# Patient Record
Sex: MALE | Race: OTHER
[De-identification: names, ages, dates, MRNs, and addresses within clinical notes are randomized per-mention and may not be internally consistent; named-entity substitution may affect disease eponyms.]

---

## 2020-07-16 ENCOUNTER — HOSPITAL ENCOUNTER (EMERGENCY)
Dept: HOSPITAL 74 - JER | Age: 60
Discharge: HOME | End: 2020-07-16
Payer: COMMERCIAL

## 2020-07-16 VITALS — DIASTOLIC BLOOD PRESSURE: 82 MMHG | SYSTOLIC BLOOD PRESSURE: 137 MMHG | HEART RATE: 73 BPM

## 2020-07-16 VITALS — TEMPERATURE: 99.2 F

## 2020-07-16 VITALS — BODY MASS INDEX: 36.6 KG/M2

## 2020-07-16 DIAGNOSIS — M54.41: Primary | ICD-10-CM

## 2020-07-16 LAB
ALBUMIN SERPL-MCNC: 4.1 G/DL (ref 3.4–5)
ALP SERPL-CCNC: 76 U/L (ref 45–117)
ALT SERPL-CCNC: 37 U/L (ref 13–61)
ANION GAP SERPL CALC-SCNC: 8 MMOL/L (ref 8–16)
APPEARANCE UR: CLEAR
AST SERPL-CCNC: 22 U/L (ref 15–37)
BASOPHILS # BLD: 0.3 % (ref 0–2)
BILIRUB CONJ SERPL-MCNC: 0.2 MG/DL (ref 0–0.2)
BILIRUB SERPL-MCNC: 0.7 MG/DL (ref 0.2–1)
BILIRUB UR STRIP.AUTO-MCNC: NEGATIVE MG/DL
BUN SERPL-MCNC: 12.9 MG/DL (ref 7–18)
CALCIUM SERPL-MCNC: 9.8 MG/DL (ref 8.5–10.1)
CHLORIDE SERPL-SCNC: 104 MMOL/L (ref 98–107)
CO2 SERPL-SCNC: 26 MMOL/L (ref 21–32)
COLOR UR: YELLOW
CREAT SERPL-MCNC: 1.1 MG/DL (ref 0.55–1.3)
DEPRECATED RDW RBC AUTO: 13.3 % (ref 11.9–15.9)
EOSINOPHIL # BLD: 0.2 % (ref 0–4.5)
GLUCOSE SERPL-MCNC: 111 MG/DL (ref 74–106)
HCT VFR BLD CALC: 45.5 % (ref 35.4–49)
HGB BLD-MCNC: 15.4 GM/DL (ref 11.7–16.9)
KETONES UR QL STRIP: NEGATIVE
LEUKOCYTE ESTERASE UR QL STRIP.AUTO: NEGATIVE
LYMPHOCYTES # BLD: 6.9 % (ref 8–40)
MCH RBC QN AUTO: 29.8 PG (ref 25.7–33.7)
MCHC RBC AUTO-ENTMCNC: 33.9 G/DL (ref 32–35.9)
MCV RBC: 87.8 FL (ref 80–96)
MONOCYTES # BLD AUTO: 3.3 % (ref 3.8–10.2)
NEUTROPHILS # BLD: 89.3 % (ref 42.8–82.8)
NITRITE UR QL STRIP: NEGATIVE
PH UR: 5.5 [PH] (ref 5–8)
PLATELET # BLD AUTO: 263 K/MM3 (ref 134–434)
PMV BLD: 9 FL (ref 7.5–11.1)
POTASSIUM SERPLBLD-SCNC: 4.3 MMOL/L (ref 3.5–5.1)
PROT SERPL-MCNC: 7.6 G/DL (ref 6.4–8.2)
PROT UR QL STRIP: NEGATIVE
PROT UR QL STRIP: NEGATIVE
RBC # BLD AUTO: 5.18 M/MM3 (ref 4–5.6)
SODIUM SERPL-SCNC: 138 MMOL/L (ref 136–145)
SP GR UR: 1.02 (ref 1.01–1.03)
UROBILINOGEN UR STRIP-MCNC: 0.2 MG/DL (ref 0.2–1)
WBC # BLD AUTO: 12.1 K/MM3 (ref 4–10)

## 2020-07-16 PROCEDURE — 3E033GC INTRODUCTION OF OTHER THERAPEUTIC SUBSTANCE INTO PERIPHERAL VEIN, PERCUTANEOUS APPROACH: ICD-10-PCS | Performed by: NURSE PRACTITIONER

## 2020-07-16 PROCEDURE — 3E0333Z INTRODUCTION OF ANTI-INFLAMMATORY INTO PERIPHERAL VEIN, PERCUTANEOUS APPROACH: ICD-10-PCS | Performed by: NURSE PRACTITIONER

## 2020-07-16 NOTE — PDOC
Documentation entered by Jesica Randall SCRIBE, acting as scribe for Mecca Luna DO.








Mecca Luna DO:  This documentation has been prepared by the ammye, 

Jesica Randall SCRIBE, under my direction and personally reviewed by me in its 

entirety.  I confirm that the documentation accurately reflects all work, 

treatment, procedures, and medical decision making performed by me.  





Attending Attestation





- Resident


Resident Name: Johnny Gonzalez





- ED Attending Attestation


I have performed the following: I have examined & evaluated the patient, The 

case was reviewed & discussed with the resident, I agree w/resident's findings &

plan, Exceptions are as noted





- HPI


HPI: 





07/16/20 21:32


Patient is a 60 year old male with a significant past medical history of lower 

back pain, urinary frequency and s/p left heel surgery (August 2014), who 

presents to the ED with right sided back pain accompanies with nausea x1 day. 

Patient stated his pain is constant, non-radiating, and described the pain as a 

"burning sensation". Patient has had similar symptoms in the past but has not 

followed up with a specialist. 





Patient denies: headache, fever, chills, syncope, changes in sensation or 

strength, any vision changes, vomiting, SOB, chest pain, abdominal pain, 

diarrhea, any urinary issues, any history of renal stones, alcohol intake, drug 

use, or smoking.





Allergies: NKDA





PCP: Dr. Galdino Wood


   








- Physicial Exam


PE: 





07/16/20 21:55


Gen: aaox3, nad


heart: +s1s2 reg


lungs: cta b/l


abd: soft, nt/nd +bs


ext: no c/c/e


back: no c/t/l spine ttp, R paraspinal ttp that reproduces his pain, no cva ttp





- Medical Decision Making





07/16/20 21:56


a/p: 59yo male with burning sensation to the R flank and low back


-no urinary complaints


-pt with burning sensation to the R low back and paraspinal ttp


-similar to prior episodes of lbp in the past


-reprinted MRI lumbar spine for the patient and discussed in detail from Dec 

2019


-neuro intact


-ambulates in the ER with steady gait and in a straight line, able to ambulate 

on toes and heels


-will mediate for muscle spasms and reassess





07/16/20 22:01


pt without signs/symptoms of caude equina or cord compression





Discharge





- Discharge Information


Problems reviewed: Yes


Clinical Impression/Diagnosis: 


 Right-sided back pain





Condition: Stable


Disposition: HOME





- Admission


No





- Additional Discharge Information


Prescriptions: 


Methocarbamol [Robaxin -] 500 mg PO BID PRN #10 tablet


 PRN Reason: Pain





- Follow up/Referral


Referrals: 


Cr Caldwell MD, FAANS [Staff Physician] - 


Galdino Wood MD [Primary Care Provider] - 


José Miguel Persaud MD [Staff Physician] - 





- Patient Discharge Instructions


Patient Printed Discharge Instructions:  DI for Low Back Pain


Additional Instructions: 


Take ibuprofen (e.g. Motrin) for pain as directed by the label.


You may add tylenol as directed on the label for pain control. 


Try hot or ice packs on the affected area. 


Maintain moderate physical activity. 





Follow up with Neurosurgery in the next 5-10 days.  We referred you to two 

neurosurgeons, you may call and schedule an appointment at the numbers attached.

If neither office accepts your insurance, you may use this referral for any in-

network neurosurgeon. 





Follow up with your primary care doctor regarding this visit in the next 10-14 

days


Continue your home medications as prescribed.





Immediately return to the nearest Emergency Department if you experience 

worsening symptoms or if you develop new or concerning symptoms including but 

not limited to changes in sensation or strength, inability to control bladder or

bowels, anything that concerns you. 





- Post Discharge Activity

## 2020-07-16 NOTE — PDOC
Rapid Medical Evaluation


Time Seen by Provider: 07/16/20 20:10


Medical Evaluation: 


                                    Allergies











Allergy/AdvReac Type Severity Reaction Status Date / Time


 


No Known Allergies Allergy   Verified 01/16/15 09:33











07/16/20 20:10


I have performed a brief in-person evaluation of this patient.





CC: right flank pain since this afternoon. Felt warm and nauseous when pain 

started. +nausea currently 





PE: No CVAT. Abd SNTND. Neuro- no focal findings.





Orders: urine, labs





Patient will proceed to ED for further evaluation.





**Discharge Disposition





- Diagnosis


 Flank pain








- Referrals





- Patient Instructions





- Post Discharge Activity

## 2020-07-16 NOTE — PDOC
History of Present Illness





- General


Chief Complaint: Nausea


Stated Complaint: BACK PAIN


Time Seen by Provider: 07/16/20 20:10


History Source: Patient


Exam Limitations: No Limitations





- History of Present Illness


Initial Comments: 





07/16/20 21:40


60M PMH chronic LBP presenting with one day of constant, burning nonradiating 

right sided back pain with associated nausea. No f/c. +hx multiple similar 

episodes in the past. no h/o renal stones; no urinary sx. denies changes in 

sensation or strength, denies loss of bowel / bladder control. Has received MRI 

in past but does not know results, never seen back specialist. NKDA. no etoh, 

tobacco, drugs. 














Past History





- Medical History


Allergies/Adverse Reactions: 


                                    Allergies











Allergy/AdvReac Type Severity Reaction Status Date / Time


 


No Known Allergies Allergy   Verified 01/16/15 09:33











Home Medications: 


Ambulatory Orders





Alfuzosin HCl [Uroxatral] 10 mg PO DAILY 01/16/15 


Fesoterodine Fumarate [Toviaz] 4 mg PO DAILY 01/16/15 


Valacyclovir HCl [Valtrex -] 500 mg PO DAILY 01/16/15 


Methocarbamol [Robaxin -] 500 mg PO BID PRN #10 tablet 07/16/20 








COPD: No


 Disorders: Yes (URINARY FREQUENCY)





- Psycho-Social/Smoking History


Smoking Status: No


Smoking History: Never smoked


Number of Cigarettes Smoked Daily: 0





- Substance Abuse Hx (Audit-C & DAST Scrn)


How often the patient has a drink containing alcohol: Never


Score: In Men: 4 or > Positive; In Women: 3 or > Positive: 0


Screen Result (Pos requires Nsg. Audit-10AR): Negative


In the last yr the pt used illegal drug/Rx for NonMed reason: No


Score:  Yes response is considered Positive: 0


Screen Result (Positive result requires Nsg. DAST-10): Negative





**Review of Systems





- Review of Systems


Comments:: 





07/17/20 05:26


CONSTITUTIONAL: Denies F / C


HEENT: Denies headache, lightheadedness, dizziness, changes in vision / hearing,

diplopia, blurry vision, sore throat, rhinorrhea


RESP: Denies SOB, cough


CARD: Denies chest pain


GI: + nausea. Denies V / D, abdominal pain, bloody stool, inability to tolerate 

PO.


: Denies dysuria, hematuria, frequency


NEURO: Denies numbness, tingling, weakness, bowel/bladder control. 


MSK: endorses right sided back pain 


SKIN: Denies rashes











*Physical Exam





- Vital Signs


                                Last Vital Signs











Temp Pulse Resp BP Pulse Ox


 


 99.2 F   71   19   146/75   99 


 


 07/16/20 20:13  07/16/20 20:13  07/16/20 20:13  07/16/20 20:13  07/16/20 20:13














- Physical Exam





07/17/20 05:26


Pt examined after receiving pain medication w/ significant symptomatic relief


GEN: Well appearing, NAD, comfortable. AAOx3.


HEENT: NC/AT, EOMI, PERRL. No facial asymmetry. Moist mucous membranes. Normal 

voice. Supple neck w/ FROM.


CV: S1/S2, RRR, no m/r/g


LUNG: CTAB, no wheezes, crackles, rales, rhonchi. 


GI: Soft, ndnt, +BS, no guarding, no rebound. No masses.  Neg CVAT b/l. 


MSK: 2+ distal pulses. No LE edema. No obvious deformities of all extremities. 


BACK: patient indicates right lower paraspinal muscle region for pain. No 

obvious deformities, no step offs, no midline TTP. No signs of trauma.


SKIN: Warm, dry, no rashes appreciated.


PSYCH: Normal mood and affect.


NEURO: Moving all extremities well. 5/5 UE strength b/l. 5/5 LE strength b/l. 

Sensation symmetric and intact throughout. Ambulates w/ normal gait. 














ED Treatment Course





- LABORATORY


CBC & Chemistry Diagram: 


                                 07/16/20 20:30





                                 07/16/20 20:30





- ADDITIONAL ORDERS


Additional order review: 


                               Laboratory  Results











  07/16/20 07/16/20





  20:30 20:30


 


Sodium  138 


 


Potassium  4.3 


 


Chloride  104 


 


Carbon Dioxide  26 


 


Anion Gap  8 


 


BUN  12.9 


 


Creatinine  1.1 


 


Est GFR (CKD-EPI)AfAm  84.12 


 


Est GFR (CKD-EPI)NonAf  72.58 


 


Random Glucose  111 H 


 


Calcium  9.8 


 


Urine Color   Yellow


 


Urine Appearance   Clear


 


Urine pH   5.5


 


Ur Specific Gravity   1.023


 


Urine Protein   Negative


 


Urine Glucose (UA)   Negative


 


Urine Ketones   Negative


 


Urine Blood   Negative


 


Urine Nitrite   Negative


 


Urine Bilirubin   Negative


 


Urine Urobilinogen   0.2


 


Ur Leukocyte Esterase   Negative








                                        











  07/16/20





  20:30


 


RBC  5.18


 


MCV  87.8


 


MCHC  33.9


 


RDW  13.3


 


MPV  9.0


 


Neutrophils %  89.3 H D


 


Lymphocytes %  6.9 L D


 


Monocytes %  3.3 L


 


Eosinophils %  0.2  D


 


Basophils %  0.3














- Medications


Given in the ED: 


ED Medications














Discontinued Medications














Generic Name Dose Route Start Last Admin





  Trade Name Freq  PRN Reason Stop Dose Admin


 


Morphine Sulfate  4 mg  07/16/20 20:43  07/16/20 20:58





  Morphine Injection -  IVPUSH  07/16/20 20:44  4 mg





  ONCE ONE   Administration


 


Ondansetron HCl  4 mg  07/16/20 20:17  07/16/20 20:58





  Zofran Injection  IVPUSH  07/16/20 20:18  4 mg





  ONCE ONE   Administration














Medical Decision Making





- Medical Decision Making





07/17/20 05:26


60M with one day of burning constant and nonradiating right sided back pain. 

Neurologically intact, no red flags. 


Likely exacerbation of LBP; will check labs and urine for infection and signs of

renal stone but unlikely. S/P zofran and morphine with significant symptomatic 

relief.  


- CBC, CMP


- Robaxin for muscle spasm 


- MRI on 12/13/19 demonstrating multiple degenerative findings; pt provided MRI 

report and findings were reviewed


- dw patient and his wife discharge plan; they are amenable and all questions 

and concerns were addressed 


- dc home w/ neurosx f/u 





Discharge





- Discharge Information


Problems reviewed: Yes


Clinical Impression/Diagnosis: 


 Right-sided back pain





Condition: Stable


Disposition: HOME





- Admission


No





- Additional Discharge Information


Prescriptions: 


Methocarbamol [Robaxin -] 500 mg PO BID PRN #10 tablet


 PRN Reason: Pain





- Follow up/Referral


Referrals: 


Galdino Wood MD [Primary Care Provider] - 


José Miguel Persaud MD [Staff Physician] - 


Cr Caldwell MD, FAANS [Staff Physician] - 





- Patient Discharge Instructions


Patient Printed Discharge Instructions:  DI for Low Back Pain


Additional Instructions: 


We sent a prescription to your [facility], [address] [place] [address]. Please pick it up and

take as prescribed. 


DO NOT DRIVE OR OPERATE HEAVY MACHINERY WHILE TAKING THIS MEDICATION. 





Take ibuprofen (e.g. Motrin) for pain as directed by the label.


You may add tylenol as directed on the label for pain control. 


Try hot or ice packs on the affected area. 


Maintain moderate physical activity. 





Follow up with Neurosurgery in the next 5-10 days.  We referred you to two 

neurosurgeons, you may call and schedule an appointment at the numbers attached.

If neither office accepts your insurance, you may use this referral for any in-

network neurosurgeon. 





Follow up with your primary care doctor regarding this visit in the next 10-14 

days


Continue your home medications as prescribed.





Immediately return to the nearest Emergency Department if you experience 

worsening symptoms or if you develop new or concerning symptoms including but no

t limited to changes in sensation or strength, inability to control bladder or 

bowels, anything that concerns you. 





- Post Discharge Activity

## 2021-03-25 ENCOUNTER — HOSPITAL ENCOUNTER (INPATIENT)
Dept: HOSPITAL 74 - JER | Age: 61
LOS: 2 days | Discharge: HOME | DRG: 343 | End: 2021-03-27
Attending: INTERNAL MEDICINE | Admitting: INTERNAL MEDICINE
Payer: COMMERCIAL

## 2021-03-25 VITALS — BODY MASS INDEX: 39.1 KG/M2

## 2021-03-25 DIAGNOSIS — K35.80: Primary | ICD-10-CM

## 2021-03-25 DIAGNOSIS — M54.5: ICD-10-CM

## 2021-03-25 DIAGNOSIS — G47.33: ICD-10-CM

## 2021-03-25 DIAGNOSIS — E66.9: ICD-10-CM

## 2021-03-25 LAB
ALBUMIN SERPL-MCNC: 3.9 G/DL (ref 3.4–5)
ALP SERPL-CCNC: 87 U/L (ref 45–117)
ALT SERPL-CCNC: 48 U/L (ref 13–61)
ANION GAP SERPL CALC-SCNC: 8 MMOL/L (ref 8–16)
APPEARANCE UR: CLEAR
APTT BLD: 25.1 SECONDS (ref 25.2–36.5)
AST SERPL-CCNC: 24 U/L (ref 15–37)
BASOPHILS # BLD: 0.6 % (ref 0–2)
BILIRUB SERPL-MCNC: 0.6 MG/DL (ref 0.2–1)
BILIRUB UR STRIP.AUTO-MCNC: NEGATIVE MG/DL
BUN SERPL-MCNC: 10.6 MG/DL (ref 7–18)
CALCIUM SERPL-MCNC: 9.9 MG/DL (ref 8.5–10.1)
CHLORIDE SERPL-SCNC: 104 MMOL/L (ref 98–107)
CO2 SERPL-SCNC: 28 MMOL/L (ref 21–32)
COLOR UR: YELLOW
CREAT SERPL-MCNC: 1.1 MG/DL (ref 0.55–1.3)
DEPRECATED RDW RBC AUTO: 12.8 % (ref 11.9–15.9)
EOSINOPHIL # BLD: 2.4 % (ref 0–4.5)
GLUCOSE SERPL-MCNC: 88 MG/DL (ref 74–106)
HCT VFR BLD CALC: 41.1 % (ref 35.4–49)
HGB BLD-MCNC: 14 GM/DL (ref 11.7–16.9)
INR BLD: 1.05 (ref 0.83–1.09)
KETONES UR QL STRIP: (no result)
LEUKOCYTE ESTERASE UR QL STRIP.AUTO: NEGATIVE
LIPASE SERPL-CCNC: 132 U/L (ref 73–393)
LYMPHOCYTES # BLD: 12.3 % (ref 8–40)
MCH RBC QN AUTO: 29.6 PG (ref 25.7–33.7)
MCHC RBC AUTO-ENTMCNC: 34.1 G/DL (ref 32–35.9)
MCV RBC: 86.9 FL (ref 80–96)
MONOCYTES # BLD AUTO: 8.1 % (ref 3.8–10.2)
NEUTROPHILS # BLD: 76.6 % (ref 42.8–82.8)
NITRITE UR QL STRIP: NEGATIVE
PH UR: 5 [PH] (ref 5–8)
PLATELET # BLD AUTO: 299 K/MM3 (ref 134–434)
PMV BLD: 8.6 FL (ref 7.5–11.1)
POTASSIUM SERPLBLD-SCNC: 4.3 MMOL/L (ref 3.5–5.1)
PROT SERPL-MCNC: 7.8 G/DL (ref 6.4–8.2)
PROT UR QL STRIP: NEGATIVE
PROT UR QL STRIP: NEGATIVE
PT PNL PPP: 12.9 SEC (ref 9.7–13)
RBC # BLD AUTO: 4.72 M/MM3 (ref 4–5.6)
SODIUM SERPL-SCNC: 140 MMOL/L (ref 136–145)
SP GR UR: 1.02 (ref 1.01–1.03)
UROBILINOGEN UR STRIP-MCNC: 0.2 MG/DL (ref 0.2–1)
WBC # BLD AUTO: 9.7 K/MM3 (ref 4–10)

## 2021-03-25 PROCEDURE — C9803 HOPD COVID-19 SPEC COLLECT: HCPCS

## 2021-03-25 PROCEDURE — U0003 INFECTIOUS AGENT DETECTION BY NUCLEIC ACID (DNA OR RNA); SEVERE ACUTE RESPIRATORY SYNDROME CORONAVIRUS 2 (SARS-COV-2) (CORONAVIRUS DISEASE [COVID-19]), AMPLIFIED PROBE TECHNIQUE, MAKING USE OF HIGH THROUGHPUT TECHNOLOGIES AS DESCRIBED BY CMS-2020-01-R: HCPCS

## 2021-03-25 PROCEDURE — U0005 INFEC AGEN DETEC AMPLI PROBE: HCPCS

## 2021-03-26 LAB
ALBUMIN SERPL-MCNC: 3.2 G/DL (ref 3.4–5)
ALP SERPL-CCNC: 74 U/L (ref 45–117)
ALT SERPL-CCNC: 37 U/L (ref 13–61)
ANION GAP SERPL CALC-SCNC: 4 MMOL/L (ref 8–16)
AST SERPL-CCNC: 16 U/L (ref 15–37)
BILIRUB SERPL-MCNC: 0.3 MG/DL (ref 0.2–1)
BUN SERPL-MCNC: 12.6 MG/DL (ref 7–18)
CALCIUM SERPL-MCNC: 8.4 MG/DL (ref 8.5–10.1)
CHLORIDE SERPL-SCNC: 106 MMOL/L (ref 98–107)
CO2 SERPL-SCNC: 27 MMOL/L (ref 21–32)
CREAT SERPL-MCNC: 1.1 MG/DL (ref 0.55–1.3)
DEPRECATED RDW RBC AUTO: 12.9 % (ref 11.9–15.9)
GLUCOSE SERPL-MCNC: 143 MG/DL (ref 74–106)
HCT VFR BLD CALC: 37.2 % (ref 35.4–49)
HGB BLD-MCNC: 12.9 GM/DL (ref 11.7–16.9)
MAGNESIUM SERPL-MCNC: 1.9 MG/DL (ref 1.8–2.4)
MCH RBC QN AUTO: 29.9 PG (ref 25.7–33.7)
MCHC RBC AUTO-ENTMCNC: 34.7 G/DL (ref 32–35.9)
MCV RBC: 86.2 FL (ref 80–96)
PHOSPHATE SERPL-MCNC: 3.9 MG/DL (ref 2.5–4.9)
PLATELET # BLD AUTO: 250 K/MM3 (ref 134–434)
PMV BLD: 7.9 FL (ref 7.5–11.1)
POTASSIUM SERPLBLD-SCNC: 3.9 MMOL/L (ref 3.5–5.1)
PROT SERPL-MCNC: 6.4 G/DL (ref 6.4–8.2)
RBC # BLD AUTO: 4.32 M/MM3 (ref 4–5.6)
SODIUM SERPL-SCNC: 137 MMOL/L (ref 136–145)
WBC # BLD AUTO: 7.9 K/MM3 (ref 4–10)

## 2021-03-26 PROCEDURE — 0DTJ4ZZ RESECTION OF APPENDIX, PERCUTANEOUS ENDOSCOPIC APPROACH: ICD-10-PCS | Performed by: SURGERY

## 2021-03-27 VITALS — SYSTOLIC BLOOD PRESSURE: 98 MMHG | TEMPERATURE: 98.6 F | DIASTOLIC BLOOD PRESSURE: 61 MMHG | HEART RATE: 73 BPM

## 2021-03-27 LAB
ANION GAP SERPL CALC-SCNC: 8 MMOL/L (ref 8–16)
BUN SERPL-MCNC: 13.4 MG/DL (ref 7–18)
CALCIUM SERPL-MCNC: 8.5 MG/DL (ref 8.5–10.1)
CHLORIDE SERPL-SCNC: 107 MMOL/L (ref 98–107)
CO2 SERPL-SCNC: 27 MMOL/L (ref 21–32)
CREAT SERPL-MCNC: 1.1 MG/DL (ref 0.55–1.3)
DEPRECATED RDW RBC AUTO: 12.8 % (ref 11.9–15.9)
GLUCOSE SERPL-MCNC: 119 MG/DL (ref 74–106)
HCT VFR BLD CALC: 36.1 % (ref 35.4–49)
HGB BLD-MCNC: 12.2 GM/DL (ref 11.7–16.9)
MAGNESIUM SERPL-MCNC: 2 MG/DL (ref 1.8–2.4)
MCH RBC QN AUTO: 29.6 PG (ref 25.7–33.7)
MCHC RBC AUTO-ENTMCNC: 33.9 G/DL (ref 32–35.9)
MCV RBC: 87.3 FL (ref 80–96)
PHOSPHATE SERPL-MCNC: 3 MG/DL (ref 2.5–4.9)
PLATELET # BLD AUTO: 292 K/MM3 (ref 134–434)
PMV BLD: 8.1 FL (ref 7.5–11.1)
POTASSIUM SERPLBLD-SCNC: 4.2 MMOL/L (ref 3.5–5.1)
RBC # BLD AUTO: 4.13 M/MM3 (ref 4–5.6)
SODIUM SERPL-SCNC: 141 MMOL/L (ref 136–145)
WBC # BLD AUTO: 13.3 K/MM3 (ref 4–10)